# Patient Record
Sex: FEMALE | Race: BLACK OR AFRICAN AMERICAN | URBAN - METROPOLITAN AREA
[De-identification: names, ages, dates, MRNs, and addresses within clinical notes are randomized per-mention and may not be internally consistent; named-entity substitution may affect disease eponyms.]

---

## 2022-06-10 ENCOUNTER — OFFICE VISIT (OUTPATIENT)
Dept: ENDOCRINOLOGY | Age: 27
End: 2022-06-10
Payer: COMMERCIAL

## 2022-06-10 VITALS
BODY MASS INDEX: 46.41 KG/M2 | DIASTOLIC BLOOD PRESSURE: 60 MMHG | HEART RATE: 84 BPM | HEIGHT: 60 IN | SYSTOLIC BLOOD PRESSURE: 103 MMHG | WEIGHT: 236.4 LBS

## 2022-06-10 DIAGNOSIS — E22.1 HYPERPROLACTINEMIA (HCC): Primary | ICD-10-CM

## 2022-06-10 DIAGNOSIS — E66.01 CLASS 3 SEVERE OBESITY DUE TO EXCESS CALORIES WITH BODY MASS INDEX (BMI) OF 45.0 TO 49.9 IN ADULT, UNSPECIFIED WHETHER SERIOUS COMORBIDITY PRESENT (HCC): ICD-10-CM

## 2022-06-10 PROCEDURE — 99203 OFFICE O/P NEW LOW 30 MIN: CPT | Performed by: GENERAL ACUTE CARE HOSPITAL

## 2022-06-10 RX ORDER — METFORMIN HYDROCHLORIDE 500 MG/1
500 TABLET ORAL 2 TIMES DAILY WITH MEALS
COMMUNITY
Start: 2022-05-20

## 2022-06-10 NOTE — PATIENT INSTRUCTIONS
Please complete the blood work fasting at 8 AM within the coming week and I will call you with the results and next steps in management. Learning About a Prolactinoma  What is a prolactinoma? A prolactinoma is a tumor on the pituitary gland that makes too much of the hormone prolactin. This type of tumor is benign, which means it's not cancer. The pituitary (say \"jqs-INU-ww-tair-ee\") glandat the base of the brainmakes prolactin. After a woman is pregnant, this hormone causes the breasts to make milk. But a prolactinoma also makes prolactin. This means that your body can have too much of the hormone. It's not normal for women who aren't pregnant or nursing to have a high level of prolactin. For both men and women, too much of this hormone can make the breasts produce milk. It also can cause low sex drive and infertility. What are the symptoms? You might not have any symptoms from a prolactinoma. But in some men and women, their breasts may produce milk. In women, an increase in prolactin can lower the level of estrogen. That can cause infertility, menstrual changes, and less desire to have sex. In men, it can lower the level of testosterone. That can cause erection problems and less desire to have sex. The tumor may cause a headache. And sometimes the tumor presses on the optic nerves, which are near the pituitary gland. This might cause vision problems. How is it diagnosed? A blood test will show if you have too much prolactin in your blood. Your doctor also may do an MRI test. It can show if you have the tumor and how big it is. How is it treated? Sometimes, no treatment is needed. If you have symptoms, your doctor may treat you with dopamine agonists. This medicine can shrink the tumor. It also may bring the level of prolactin back to normal. You may need to take this medicine for 2 years or more. During and after treatment, you will get routine tests to check your hormone levels.   In some cases, surgery is done to remove the tumor. This may happen if you can't take the medicine or it doesn't work. Surgery also could be done if the tumor grows or causes problems like headaches or vision problems. Follow-up care is a key part of your treatment and safety. Be sure to make and go to all appointments, and call your doctor if you are having problems. It's also a good idea to know your test results and keep a list of the medicines you take. Where can you learn more? Go to http://www.gray.com/  Enter P110 in the search box to learn more about \"Learning About a Prolactinoma. \"  Current as of: September 8, 2021               Content Version: 13.2  © 2006-2022 [employer]. Care instructions adapted under license by [employer] (which disclaims liability or warranty for this information). If you have questions about a medical condition or this instruction, always ask your healthcare professional. [place] [address] any warranty or liability for your use of this information. Learning About Weight-Loss (Bariatric) Surgery  What is weight-loss surgery? Bariatric surgery is surgery to help you lose weight. This type of surgery is only used for people who are very overweight and have not been able to lose weight with diet and exercise. This surgery makes the stomach smaller. Some types of surgery also change the connection between your stomach and intestines. Having weight-loss surgery is a big step. After surgery, you'll need to make new, lifelong changes in how you eat and drink. How is weight-loss surgery done? Bariatric surgery may be either \"open\" or \"laparoscopic. \" Open surgery is done through a large cut (incision) in the belly. Laparoscopic surgery is done through several small cuts. The doctor puts a lighted tube, or scope, and other surgical tools through small cuts in your belly.  The doctor is able to see your organs with the scope. There are different types of bariatric surgery. Gastric sleeve surgery  The surgery is usually done through several small incisions in the belly. The doctor removes more than half of your stomach. This leaves a thin sleeve, or tube, that is about the size of a banana. Because part of your stomach has been removed, this can't be reversed. Gauri-en-Y gastric bypass surgery  Gauri-en-Y (say \"felix-en-why\") surgery changes the connection between the stomach and the intestines. The doctor separates a section of your stomach from the rest of your stomach. This makes a small pouch. The new pouch will hold the food you eat. The doctor connects the stomach pouch to the middle part of the small intestine. Gastric banding surgery  The surgery is usually done through several small incisions in the belly. The doctor wraps a band around the upper part of the stomach. This creates a small pouch. The small size of the pouch means that you will get full after you eat just a small amount of food. The doctor can inflate or deflate the band to adjust the size. This lets the doctor adjust how quickly food passes from the new pouch into the stomach. It does not change the connection between the stomach and the intestines. What can you expect after the surgery? You may stay in the hospital for one or more days after the surgery. How long you stay depends on the type of surgery you had. Most people need 2 to 4 weeks before they are ready to get back to their usual routine. Your doctor will give you specific instructions about what to eat after the surgery. You'll start with only small amounts of soft foods and liquids. Bit by bit, you will be able to eat more solid foods. Your doctor may advise you to work with a dietitian. This way you'll be sure to get enough protein, vitamins, and minerals while you are losing weight. Even with a healthy diet, you may need to take vitamin and mineral supplements.   After surgery, you will not be able to eat very much at one time. You will get full quickly. Try not to eat too much at one time or eat foods that are high in fat or sugar. If you do, you may vomit, get stomach pain, or have diarrhea. Weight loss  You probably will lose weight very quickly in the first few months after surgery. As time goes on, your weight loss will slow down. You will have regular doctor visits to check how you are doing. Emotions  It is common to have many emotions after this surgery. You may feel happy or excited as you begin to lose weight. But you may also feel overwhelmed or frustrated by the changes that you have to make in your diet, activity, and lifestyle. Talk with your doctor if you have concerns or questions. Think of bariatric surgery as a tool to help you lose weight. It isn't an instant fix. You will still need to eat a healthy diet and get regular exercise. This will help you reach your weight goal and avoid regaining the weight you lose. Follow-up care is a key part of your treatment and safety. Be sure to make and go to all appointments, and call your doctor if you are having problems. It's also a good idea to know your test results and keep a list of the medicines you take. Where can you learn more? Go to http://www.gray.com/  Enter G469 in the search box to learn more about \"Learning About Weight-Loss (Bariatric) Surgery. \"  Current as of: December 27, 2021               Content Version: 13.2  © 7296-6565 [employer]. Care instructions adapted under license by [employer] (which disclaims liability or warranty for this information). If you have questions about a medical condition or this instruction, always ask your healthcare professional. [place] [address] any warranty or liability for your use of this information.

## 2022-06-10 NOTE — PROGRESS NOTES
CHIEF COMPLAINT: elevated prolactin level    HISTORY OF PRESENT ILLNESS:   Franchesca Wright is a 32 y.o. female with a PMHx as noted below who was referred to our endocrinology clinic for evaluation of hyperprolactinemia. Patient was recently evaluated by her doctor and was found to have an elevated prolactin level. Relevant labs were reviewed: 03/04/2022  Prolactin 36.6  TSH n/a  Ft4 n/a  LH n/a  FSH n/a  Estradiol n/a    She was recently diagnosed with PCOS several months ago and started on metfomin 500mg BID. No labs were available for review except prolactin level. Also per msGlendy Sorensen she had an US pelvis done and she was told she has some ovarian cysts on 1 side but she does not remember the details. Symptoms review:   Headaches:frontal headache, started last week, resolved after tylenol,   Visual Disturbances: denies   Galactorrhea: denies  Menstrual pattern: was irregular 1 or every 2 months, last 3 days, cramping, metformin regulated it, now occurring every month    Medication review:  Not using over the counter meds    Was on OCP Junel for 1 month in Ramadan (April), stopped it due to severe cramps with increased clots formation and now she feels a lot better being on metformin with her menstrual cycle improving    Patient does not appear to be on a dopamine antagonist or other medication which may result in elevated prolactin levels. She was not sick or under stress when the prior labs were done      PAST MEDICAL/SURGICAL HISTORY:   No past medical history on file. No past surgical history on file. ALLERGIES:   No Known Allergies    MEDICATIONS ON ADMISSION:     Current Outpatient Medications:     metFORMIN (GLUCOPHAGE) 500 mg tablet, 500 mg two (2) times daily (with meals). , Disp: , Rfl:     SOCIAL HISTORY:   Social History     Socioeconomic History    Marital status: SINGLE     Spouse name: Not on file    Number of children: Not on file    Years of education: Not on file    Highest education level: Not on file   Occupational History    Not on file   Tobacco Use    Smoking status: Never Smoker    Smokeless tobacco: Never Used   Substance and Sexual Activity    Alcohol use: No    Drug use: No    Sexual activity: Not on file   Other Topics Concern    Not on file   Social History Narrative    Not on file     Social Determinants of Health     Financial Resource Strain:     Difficulty of Paying Living Expenses: Not on file   Food Insecurity:     Worried About Running Out of Food in the Last Year: Not on file    Makenna of Food in the Last Year: Not on file   Transportation Needs:     Lack of Transportation (Medical): Not on file    Lack of Transportation (Non-Medical): Not on file   Physical Activity:     Days of Exercise per Week: Not on file    Minutes of Exercise per Session: Not on file   Stress:     Feeling of Stress : Not on file   Social Connections:     Frequency of Communication with Friends and Family: Not on file    Frequency of Social Gatherings with Friends and Family: Not on file    Attends Islam Services: Not on file    Active Member of [address] [place] [address] or Organizations: Not on file    Attends Club or Organization Meetings: Not on file    Marital Status: Not on file   Intimate Partner Violence:     Fear of Current or Ex-Partner: Not on file    Emotionally Abused: Not on file    Physically Abused: Not on file    Sexually Abused: Not on file   Housing Stability:     Unable to Pay for Housing in the Last Year: Not on file    Number of [place] in the Last Year: Not on file    Unstable Housing in the Last Year: Not on file       FAMILY HISTORY:  No family history on file. REVIEW OF SYSTEMS: Complete ROS assessed and noted for that which is described above, all else are negative.     CONSTITUTIONAL: no fevers, chills, weight loss  EYES: no blurry vision or double vision  CARDIOVASCULAR: no chest pain or palpitations  RESPIRATORY: no cough or shortness of breath  GASTROINTESTINAL: no dysphagia or abdominal pain  MUSCULOSKELETAL: no joint pains or weakness  SKIN: no rashes  NEUROLOGICAL: no numbness, tingling, or headaches  PSYCHIATRIC: no depression or anxiety  ENDOCRINE: no heat or cold intolerance, no polyuria or polydipsia      PHYSICAL EXAMINATION:    VITAL SIGNS:  Visit Vitals  /60   Pulse 84   Ht 5' 0.05\" (1.525 m)   Wt 236 lb 6.4 oz (107.2 kg)   BMI 46.09 kg/m²       GENERAL: NCAT, Sitting comfortably, NAD  EYES: EOMI, non-icteric, no proptosis  Ear/Nose/Throat: NCAT, no inflammation, no masses  LYMPH NODES: No LAD  CARDIOVASCULAR: S1 S2, RRR, No murmur, 2+ radial pulses  RESPIRATORY: CTA b/l, no wheeze/rales  GASTROINTESTINAL: ND  MUSCULOSKELETAL: Normal ROM, no atrophy  SKIN: warm, no edema/rash/ or other skin changes  NEUROLOGIC: 5/5 power all extremities, no tremors, AAOx3  PSYCHIATRIC: Normal affect, Normal insight and judgement         REVIEW OF LABORATORY AND RADIOLOGY DATA:   Labs and documentation have been reviewed as described above. ASSESSMENT AND PLAN:   Maryellen Encarnacion is a 32 y.o. female with a PMHx as noted above who was referred to our endocrinology clinic for evaluation of hyperprolactinemia. Hyperprolactinemia    Today we spent time to discuss the mechanisms behind prolactin secretion including the normal physiology and the pathophysiologic mechanisms that can interfere with normal prolactin secretion. We also discussed the usual symptoms which may or may not be present. We reviewed the possibilities of medications resulting in such abnormalities and also the notion of pituitary tumors which can over-secrete prolactin, in addition to non-prolactin secreting tumors which can interfere with the pituitary stalk and thus the normal dopamine-prolactin regulation. Based on the patients presentation and labs, it is important to check the rest of her pituitary function and based on results may obtain an MRI for further evaluation. Estradiol / LH / [facility]  TSH/FT4  Prolactin  IGF-1  AM cortisol        Obesity    Today we spent time discussing the foundation of exercise and diet. We discussed the different approaches to weight loss as an adjunct to diet/exercise lifestyle modifications, which include pharmacologic and surgical intervention. Before medications and surgery are considered, these foundational efforts are important. Even in the case of using weight loss medications, diet and exercise are still the foundation. We dicussed the various weight loss medications available, their costs, the role of medical weight loss riders on insurance plans for coverage, and also their potential side effects. Concerning bariatric surgery we did discuss this option, noting that proper adherence to diet and exercise is required before most surgeons and insurance companies would proceed with this. Plan:  Exercise: Goal to identify an exercise routine within 1 week from today. Diet: She is on ketogenic diet she started recently. We discussed about healthy dietary options and referred to dietician. Medication: She will review whether a medical weight loss rider is included on the health insurance plan, and will also look into the cost of adding on a medical weight loss rider in the future if it is optional. Plan to discuss further after reviewing any progress with diet and exercise. Surgery: She is a candidate for bariatric surgery, she will need to adopt the lifestyle modifications first and referred to bariatric surgery clinica    * I have advised the patient that I am willing to be as aggressive as she   is at she approach toward losing weight, and that we would do this safely. We discussed the expected course, resolution and complications of the diagnosis(es) in detail. Medication risks, benefits, costs, interactions, and alternatives were discussed as indicated.     I advised Maryellen Alysha to contact the office if her condition worsens, changes or fails to improve as anticipated. Patient expressed understanding with the diagnosis(es) and plan. MD [facility]    Please see patient instructions.

## 2022-06-16 LAB
CORTIS AM PEAK SERPL-MCNC: 7.8 UG/DL (ref 6.2–19.4)
ESTRADIOL SERPL-MCNC: 199 PG/ML
FSH SERPL-ACNC: 7.4 MIU/ML
IGF-I SERPL-MCNC: 84 NG/ML (ref 91–308)
LH SERPL-ACNC: 23.5 MIU/ML
PROLACTIN SERPL-MCNC: 24.6 NG/ML (ref 4.8–23.3)
T4 FREE SERPL-MCNC: 1.15 NG/DL (ref 0.82–1.77)
TSH SERPL DL<=0.005 MIU/L-ACNC: 3.18 UIU/ML (ref 0.45–4.5)

## 2022-06-23 ENCOUNTER — TELEPHONE (OUTPATIENT)
Dept: ENDOCRINOLOGY | Age: 27
End: 2022-06-23

## 2022-06-23 DIAGNOSIS — E22.1 HYPERPROLACTINEMIA (HCC): Primary | ICD-10-CM

## 2022-06-23 NOTE — TELEPHONE ENCOUNTER
Attempted to contact ms. Carrion but no response. VM left to call clinic back. Darya, pls let ms. Carrion know I would like to do brain imaging to evaluate for increased prolactin secretion (pituitary brain MRI) and if she is okay with that I will put the order in, the scheduling number [phone] and I would like to see her 1 week after she is completes the MRI to discuss the results, thanks

## 2022-06-27 NOTE — TELEPHONE ENCOUNTER
Darya, I placed the order for her pituitary MRI. Her labs again showed hyperprolactinemia (high prolactin level) and therefore needs further evaluation with a brain MRI.

## 2022-07-01 NOTE — TELEPHONE ENCOUNTER
Spoke with ms. Sorensen and I explained to her the abnormal lab results and importance for completing the Pituitary brain MRI and the gave her the number for scheduling.  She will call us back if she has any questions

## 2022-07-23 ENCOUNTER — TELEPHONE (OUTPATIENT)
Dept: ENDOCRINOLOGY | Age: 27
End: 2022-07-23

## 2022-08-01 NOTE — TELEPHONE ENCOUNTER
I spoke with patient and she stated that there was a problem with her insurance so she had to wait to be reschedule. Patient will be calling them tomorrow to see if she is able to reschedule. She will be calling the office back tomorrow to give us an update.

## 2022-10-07 ENCOUNTER — TELEPHONE (OUTPATIENT)
Dept: ENDOCRINOLOGY | Age: 27
End: 2022-10-07

## 2022-10-07 DIAGNOSIS — R79.89 LOW SERUM CORTISOL LEVEL: ICD-10-CM

## 2022-10-07 DIAGNOSIS — R79.89 LOW SERUM CORTISOL LEVEL: Primary | ICD-10-CM

## 2022-10-07 NOTE — TELEPHONE ENCOUNTER
Spoke with ms Sorensen, she was not able to complete the MRI pituitary yet as she indicates the delay is from the hospitals side rather than insurance delay, plan to have her repeat cortisol 8 AM as her previous levels were low at 7.4, she indicates understanding and based on the result will decide on further management.  She does not have any complaints at this time, she indicates understanding and agrees with plan

## 2022-10-28 LAB — CORTIS AM PEAK SERPL-MCNC: 5.3 UG/DL (ref 6.2–19.4)

## 2022-11-14 ENCOUNTER — TELEPHONE (OUTPATIENT)
Dept: ENDOCRINOLOGY | Age: 27
End: 2022-11-14

## 2022-11-14 DIAGNOSIS — R79.89 LOW SERUM CORTISOL LEVEL: Primary | ICD-10-CM

## 2022-11-14 RX ORDER — DIPHENHYDRAMINE HYDROCHLORIDE 50 MG/ML
50 INJECTION, SOLUTION INTRAMUSCULAR; INTRAVENOUS AS NEEDED
Start: 2022-11-30

## 2022-11-14 RX ORDER — HEPARIN 100 UNIT/ML
500 SYRINGE INTRAVENOUS AS NEEDED
Start: 2022-11-30

## 2022-11-14 RX ORDER — SODIUM CHLORIDE 9 MG/ML
5-250 INJECTION, SOLUTION INTRAVENOUS AS NEEDED
Status: CANCELLED | OUTPATIENT
Start: 2022-11-30

## 2022-11-14 RX ORDER — ONDANSETRON 2 MG/ML
8 INJECTION INTRAMUSCULAR; INTRAVENOUS AS NEEDED
OUTPATIENT
Start: 2022-11-30

## 2022-11-14 RX ORDER — SODIUM CHLORIDE 9 MG/ML
5-250 INJECTION, SOLUTION INTRAVENOUS AS NEEDED
OUTPATIENT
Start: 2022-11-30

## 2022-11-14 RX ORDER — ALBUTEROL SULFATE 0.83 MG/ML
2.5 SOLUTION RESPIRATORY (INHALATION) AS NEEDED
Start: 2022-11-30

## 2022-11-14 RX ORDER — EPINEPHRINE 1 MG/ML
0.3 INJECTION, SOLUTION, CONCENTRATE INTRAVENOUS AS NEEDED
OUTPATIENT
Start: 2022-11-30

## 2022-11-14 RX ORDER — ACETAMINOPHEN 325 MG/1
650 TABLET ORAL AS NEEDED
Start: 2022-11-30

## 2022-11-14 RX ORDER — HYDROCORTISONE SODIUM SUCCINATE 100 MG/2ML
100 INJECTION, POWDER, FOR SOLUTION INTRAMUSCULAR; INTRAVENOUS AS NEEDED
OUTPATIENT
Start: 2022-11-30

## 2022-11-14 RX ORDER — SODIUM CHLORIDE 0.9 % (FLUSH) 0.9 %
5-40 SYRINGE (ML) INJECTION AS NEEDED
OUTPATIENT
Start: 2022-11-30

## 2022-11-14 RX ORDER — DIPHENHYDRAMINE HYDROCHLORIDE 50 MG/ML
25 INJECTION, SOLUTION INTRAMUSCULAR; INTRAVENOUS AS NEEDED
Start: 2022-11-30

## 2022-11-14 RX ORDER — SODIUM CHLORIDE 9 MG/ML
5-40 INJECTION INTRAMUSCULAR; INTRAVENOUS; SUBCUTANEOUS AS NEEDED
OUTPATIENT
Start: 2022-11-30

## 2022-11-14 NOTE — TELEPHONE ENCOUNTER
11/14/2022  11:22 AM      Pt called and would like a call back with her blood results. OU#[phone]      Thanks,  Didi Diephilip

## 2022-11-14 NOTE — TELEPHONE ENCOUNTER
Spoke with ms Carrion about her test results and the need for Cosyntropin Stim test and explained the procedure and given [facility] number, she indicates understanding and all her questions were answered.  She indicates she had an episode yesterday with her extremities being cold and she was drenching in sweating, she did not check for fever but she felt weak and also she has been losing weight due to low appetite

## 2022-11-30 ENCOUNTER — HOSPITAL ENCOUNTER (OUTPATIENT)
Dept: INFUSION THERAPY | Age: 27
Discharge: HOME | End: 2022-11-30
Payer: COMMERCIAL

## 2022-11-30 VITALS
RESPIRATION RATE: 18 BRPM | TEMPERATURE: 97.6 F | HEART RATE: 80 BPM | DIASTOLIC BLOOD PRESSURE: 71 MMHG | SYSTOLIC BLOOD PRESSURE: 119 MMHG

## 2022-11-30 DIAGNOSIS — R79.89 LOW SERUM CORTISOL LEVEL: Primary | ICD-10-CM

## 2022-11-30 LAB
CORTIS 1H P CHAL SERPL-MCNC: 24.6 UG/DL
CORTIS 30M P CHAL SERPL-MCNC: 19.7 UG/DL
CORTIS BS SERPL-MCNC: 6 UG/DL

## 2022-11-30 PROCEDURE — 82533 TOTAL CORTISOL: CPT

## 2022-11-30 PROCEDURE — 74011000250 HC RX REV CODE- 250: Performed by: GENERAL ACUTE CARE HOSPITAL

## 2022-11-30 PROCEDURE — 36415 COLL VENOUS BLD VENIPUNCTURE: CPT

## 2022-11-30 PROCEDURE — 74011250636 HC RX REV CODE- 250/636: Performed by: GENERAL ACUTE CARE HOSPITAL

## 2022-11-30 PROCEDURE — 96374 THER/PROPH/DIAG INJ IV PUSH: CPT

## 2022-11-30 PROCEDURE — 82024 ASSAY OF ACTH: CPT

## 2022-11-30 RX ORDER — HYDROCORTISONE SODIUM SUCCINATE 100 MG/2ML
100 INJECTION, POWDER, FOR SOLUTION INTRAMUSCULAR; INTRAVENOUS AS NEEDED
Status: CANCELLED | OUTPATIENT
Start: 2022-12-16

## 2022-11-30 RX ORDER — HEPARIN 100 UNIT/ML
500 SYRINGE INTRAVENOUS AS NEEDED
Status: CANCELLED
Start: 2022-12-16

## 2022-11-30 RX ORDER — EPINEPHRINE 1 MG/ML
0.3 INJECTION, SOLUTION, CONCENTRATE INTRAVENOUS AS NEEDED
Status: CANCELLED | OUTPATIENT
Start: 2022-12-16

## 2022-11-30 RX ORDER — SODIUM CHLORIDE 9 MG/ML
5-40 INJECTION INTRAMUSCULAR; INTRAVENOUS; SUBCUTANEOUS AS NEEDED
Status: CANCELLED | OUTPATIENT
Start: 2022-12-16

## 2022-11-30 RX ORDER — ACETAMINOPHEN 325 MG/1
650 TABLET ORAL AS NEEDED
Status: CANCELLED
Start: 2022-12-16

## 2022-11-30 RX ORDER — SODIUM CHLORIDE 9 MG/ML
5-250 INJECTION, SOLUTION INTRAVENOUS AS NEEDED
Status: CANCELLED | OUTPATIENT
Start: 2022-12-16

## 2022-11-30 RX ORDER — ONDANSETRON 2 MG/ML
8 INJECTION INTRAMUSCULAR; INTRAVENOUS AS NEEDED
Status: CANCELLED | OUTPATIENT
Start: 2022-12-16

## 2022-11-30 RX ORDER — DIPHENHYDRAMINE HYDROCHLORIDE 50 MG/ML
25 INJECTION, SOLUTION INTRAMUSCULAR; INTRAVENOUS AS NEEDED
Status: CANCELLED
Start: 2022-12-16

## 2022-11-30 RX ORDER — SODIUM CHLORIDE 0.9 % (FLUSH) 0.9 %
5-40 SYRINGE (ML) INJECTION AS NEEDED
Status: CANCELLED | OUTPATIENT
Start: 2022-12-16

## 2022-11-30 RX ORDER — DIPHENHYDRAMINE HYDROCHLORIDE 50 MG/ML
50 INJECTION, SOLUTION INTRAMUSCULAR; INTRAVENOUS AS NEEDED
Status: CANCELLED
Start: 2022-12-16

## 2022-11-30 RX ORDER — ALBUTEROL SULFATE 0.83 MG/ML
2.5 SOLUTION RESPIRATORY (INHALATION) AS NEEDED
Status: CANCELLED
Start: 2022-12-16

## 2022-11-30 RX ORDER — SODIUM CHLORIDE 9 MG/ML
5-250 INJECTION, SOLUTION INTRAVENOUS AS NEEDED
Status: DISPENSED | OUTPATIENT
Start: 2022-11-30 | End: 2022-11-30

## 2022-11-30 RX ADMIN — COSYNTROPIN 250 MCG: 0.25 INJECTION, POWDER, LYOPHILIZED, FOR SOLUTION INTRAVENOUS at 08:45

## 2022-12-01 LAB — ACTH PLAS-MCNC: 14 PG/ML (ref 7.2–63.3)

## 2022-12-08 ENCOUNTER — PATIENT MESSAGE (OUTPATIENT)
Dept: ENDOCRINOLOGY | Age: 27
End: 2022-12-08

## 2023-02-19 ENCOUNTER — TRANSCRIBE ORDER (OUTPATIENT)
Dept: SCHEDULING | Age: 28
End: 2023-02-19

## 2023-02-19 DIAGNOSIS — E22.1 IDIOPATHIC HYPERPROLACTINEMIA (HCC): Primary | ICD-10-CM

## 2023-03-06 ENCOUNTER — TRANSCRIBE ORDER (OUTPATIENT)
Dept: SCHEDULING | Age: 28
End: 2023-03-06

## 2023-03-06 DIAGNOSIS — E22.1 IDIOPATHIC HYPERPROLACTINEMIA (HCC): Primary | ICD-10-CM
